# Patient Record
Sex: FEMALE | Race: WHITE | NOT HISPANIC OR LATINO | Employment: FULL TIME | URBAN - METROPOLITAN AREA
[De-identification: names, ages, dates, MRNs, and addresses within clinical notes are randomized per-mention and may not be internally consistent; named-entity substitution may affect disease eponyms.]

---

## 2017-03-02 ENCOUNTER — HOSPITAL ENCOUNTER (OUTPATIENT)
Dept: RADIOLOGY | Facility: MEDICAL CENTER | Age: 52
End: 2017-03-02
Attending: OBSTETRICS & GYNECOLOGY
Payer: COMMERCIAL

## 2017-03-02 DIAGNOSIS — N93.8 DYSFUNCTIONAL UTERINE BLEEDING: ICD-10-CM

## 2017-03-02 PROCEDURE — 76830 TRANSVAGINAL US NON-OB: CPT

## 2017-03-29 DIAGNOSIS — Z01.812 PRE-OPERATIVE LABORATORY EXAMINATION: ICD-10-CM

## 2017-03-29 LAB
BASOPHILS # BLD AUTO: 0.7 % (ref 0–1.8)
BASOPHILS # BLD: 0.06 K/UL (ref 0–0.12)
EOSINOPHIL # BLD AUTO: 0.07 K/UL (ref 0–0.51)
EOSINOPHIL NFR BLD: 0.9 % (ref 0–6.9)
ERYTHROCYTE [DISTWIDTH] IN BLOOD BY AUTOMATED COUNT: 41.8 FL (ref 35.9–50)
HCG SERPL QL: NEGATIVE
HCT VFR BLD AUTO: 33.7 % (ref 37–47)
HGB BLD-MCNC: 10.1 G/DL (ref 12–16)
IMM GRANULOCYTES # BLD AUTO: 0.03 K/UL (ref 0–0.11)
IMM GRANULOCYTES NFR BLD AUTO: 0.4 % (ref 0–0.9)
LYMPHOCYTES # BLD AUTO: 2.7 K/UL (ref 1–4.8)
LYMPHOCYTES NFR BLD: 32.8 % (ref 22–41)
MCH RBC QN AUTO: 23.3 PG (ref 27–33)
MCHC RBC AUTO-ENTMCNC: 30 G/DL (ref 33.6–35)
MCV RBC AUTO: 77.6 FL (ref 81.4–97.8)
MONOCYTES # BLD AUTO: 0.74 K/UL (ref 0–0.85)
MONOCYTES NFR BLD AUTO: 9 % (ref 0–13.4)
NEUTROPHILS # BLD AUTO: 4.62 K/UL (ref 2–7.15)
NEUTROPHILS NFR BLD: 56.2 % (ref 44–72)
NRBC # BLD AUTO: 0 K/UL
NRBC BLD AUTO-RTO: 0 /100 WBC
PLATELET # BLD AUTO: 402 K/UL (ref 164–446)
PMV BLD AUTO: 10.7 FL (ref 9–12.9)
RBC # BLD AUTO: 4.34 M/UL (ref 4.2–5.4)
WBC # BLD AUTO: 8.2 K/UL (ref 4.8–10.8)

## 2017-03-29 PROCEDURE — 36415 COLL VENOUS BLD VENIPUNCTURE: CPT

## 2017-03-29 PROCEDURE — 84703 CHORIONIC GONADOTROPIN ASSAY: CPT

## 2017-03-29 PROCEDURE — 85025 COMPLETE CBC W/AUTO DIFF WBC: CPT

## 2017-03-29 RX ORDER — OMEPRAZOLE 20 MG/1
20 CAPSULE, DELAYED RELEASE ORAL EVERY MORNING
Status: ON HOLD | COMMUNITY
End: 2017-04-19

## 2017-04-19 ENCOUNTER — HOSPITAL ENCOUNTER (OUTPATIENT)
Facility: MEDICAL CENTER | Age: 52
End: 2017-04-19
Attending: OBSTETRICS & GYNECOLOGY | Admitting: OBSTETRICS & GYNECOLOGY
Payer: COMMERCIAL

## 2017-04-19 VITALS
OXYGEN SATURATION: 99 % | HEIGHT: 65 IN | TEMPERATURE: 97.6 F | BODY MASS INDEX: 25.71 KG/M2 | HEART RATE: 79 BPM | RESPIRATION RATE: 19 BRPM | WEIGHT: 154.32 LBS

## 2017-04-19 PROBLEM — N93.9 VAGINA BLEEDING: Status: ACTIVE | Noted: 2017-04-19

## 2017-04-19 LAB
HCG UR QL: NEGATIVE
SP GR UR REFRACTOMETRY: 1.02

## 2017-04-19 PROCEDURE — 700111 HCHG RX REV CODE 636 W/ 250 OVERRIDE (IP)

## 2017-04-19 PROCEDURE — 160035 HCHG PACU - 1ST 60 MINS PHASE I: Performed by: OBSTETRICS & GYNECOLOGY

## 2017-04-19 PROCEDURE — 160002 HCHG RECOVERY MINUTES (STAT): Performed by: OBSTETRICS & GYNECOLOGY

## 2017-04-19 PROCEDURE — 700101 HCHG RX REV CODE 250

## 2017-04-19 PROCEDURE — 160009 HCHG ANES TIME/MIN: Performed by: OBSTETRICS & GYNECOLOGY

## 2017-04-19 PROCEDURE — A9270 NON-COVERED ITEM OR SERVICE: HCPCS

## 2017-04-19 PROCEDURE — 81025 URINE PREGNANCY TEST: CPT

## 2017-04-19 PROCEDURE — 160047 HCHG PACU  - EA ADDL 30 MINS PHASE II: Performed by: OBSTETRICS & GYNECOLOGY

## 2017-04-19 PROCEDURE — 160029 HCHG SURGERY MINUTES - 1ST 30 MINS LEVEL 4: Performed by: OBSTETRICS & GYNECOLOGY

## 2017-04-19 PROCEDURE — 501667 HCHG TUBING, HYSTEROSCOPY: Performed by: OBSTETRICS & GYNECOLOGY

## 2017-04-19 PROCEDURE — 110382 HCHG SHELL REV 271: Performed by: OBSTETRICS & GYNECOLOGY

## 2017-04-19 PROCEDURE — 160046 HCHG PACU - 1ST 60 MINS PHASE II: Performed by: OBSTETRICS & GYNECOLOGY

## 2017-04-19 PROCEDURE — 500766 HCHG KIT, D & E COLLECTION: Performed by: OBSTETRICS & GYNECOLOGY

## 2017-04-19 PROCEDURE — 502560 HCHG KIT, NOVASURE ENDOMETRIAL: Performed by: OBSTETRICS & GYNECOLOGY

## 2017-04-19 PROCEDURE — 88305 TISSUE EXAM BY PATHOLOGIST: CPT

## 2017-04-19 PROCEDURE — 160036 HCHG PACU - EA ADDL 30 MINS PHASE I: Performed by: OBSTETRICS & GYNECOLOGY

## 2017-04-19 PROCEDURE — 502240 HCHG MISC OR SUPPLY RC 0272: Performed by: OBSTETRICS & GYNECOLOGY

## 2017-04-19 PROCEDURE — 502587 HCHG PACK, D&C: Performed by: OBSTETRICS & GYNECOLOGY

## 2017-04-19 PROCEDURE — 160041 HCHG SURGERY MINUTES - EA ADDL 1 MIN LEVEL 4: Performed by: OBSTETRICS & GYNECOLOGY

## 2017-04-19 PROCEDURE — 160025 RECOVERY II MINUTES (STATS): Performed by: OBSTETRICS & GYNECOLOGY

## 2017-04-19 PROCEDURE — 700102 HCHG RX REV CODE 250 W/ 637 OVERRIDE(OP)

## 2017-04-19 PROCEDURE — A6404 STERILE GAUZE > 48 SQ IN: HCPCS | Performed by: OBSTETRICS & GYNECOLOGY

## 2017-04-19 PROCEDURE — A4606 OXYGEN PROBE USED W OXIMETER: HCPCS | Performed by: OBSTETRICS & GYNECOLOGY

## 2017-04-19 PROCEDURE — 160048 HCHG OR STATISTICAL LEVEL 1-5: Performed by: OBSTETRICS & GYNECOLOGY

## 2017-04-19 RX ORDER — ONDANSETRON 2 MG/ML
4 INJECTION INTRAMUSCULAR; INTRAVENOUS EVERY 6 HOURS PRN
Status: DISCONTINUED | OUTPATIENT
Start: 2017-04-19 | End: 2017-04-19 | Stop reason: HOSPADM

## 2017-04-19 RX ORDER — OXYCODONE HCL 5 MG/5 ML
SOLUTION, ORAL ORAL
Status: COMPLETED
Start: 2017-04-19 | End: 2017-04-19

## 2017-04-19 RX ORDER — KETOROLAC TROMETHAMINE 30 MG/ML
30 INJECTION, SOLUTION INTRAMUSCULAR; INTRAVENOUS
Status: DISCONTINUED | OUTPATIENT
Start: 2017-04-19 | End: 2017-04-19 | Stop reason: HOSPADM

## 2017-04-19 RX ORDER — MIDAZOLAM HYDROCHLORIDE 1 MG/ML
INJECTION INTRAMUSCULAR; INTRAVENOUS
Status: DISCONTINUED
Start: 2017-04-19 | End: 2017-04-19 | Stop reason: HOSPADM

## 2017-04-19 RX ORDER — IBUPROFEN 200 MG
600 TABLET ORAL EVERY 6 HOURS PRN
Status: DISCONTINUED | OUTPATIENT
Start: 2017-04-19 | End: 2017-04-19 | Stop reason: HOSPADM

## 2017-04-19 RX ORDER — HYDROCODONE BITARTRATE AND ACETAMINOPHEN 5; 325 MG/1; MG/1
2 TABLET ORAL EVERY 6 HOURS PRN
Status: DISCONTINUED | OUTPATIENT
Start: 2017-04-19 | End: 2017-04-19 | Stop reason: HOSPADM

## 2017-04-19 RX ORDER — SODIUM CHLORIDE, SODIUM LACTATE, POTASSIUM CHLORIDE, CALCIUM CHLORIDE 600; 310; 30; 20 MG/100ML; MG/100ML; MG/100ML; MG/100ML
1000 INJECTION, SOLUTION INTRAVENOUS
Status: COMPLETED | OUTPATIENT
Start: 2017-04-19 | End: 2017-04-19

## 2017-04-19 RX ORDER — LIDOCAINE HYDROCHLORIDE 10 MG/ML
INJECTION, SOLUTION INFILTRATION; PERINEURAL
Status: COMPLETED
Start: 2017-04-19 | End: 2017-04-19

## 2017-04-19 RX ORDER — SODIUM CHLORIDE, SODIUM LACTATE, POTASSIUM CHLORIDE, CALCIUM CHLORIDE 600; 310; 30; 20 MG/100ML; MG/100ML; MG/100ML; MG/100ML
INJECTION, SOLUTION INTRAVENOUS CONTINUOUS
Status: DISCONTINUED | OUTPATIENT
Start: 2017-04-19 | End: 2017-04-19 | Stop reason: HOSPADM

## 2017-04-19 RX ORDER — PROMETHAZINE HYDROCHLORIDE 25 MG/1
25 SUPPOSITORY RECTAL EVERY 4 HOURS PRN
Status: DISCONTINUED | OUTPATIENT
Start: 2017-04-19 | End: 2017-04-19 | Stop reason: HOSPADM

## 2017-04-19 RX ORDER — ACETAMINOPHEN 325 MG/1
650 TABLET ORAL ONCE
COMMUNITY

## 2017-04-19 RX ORDER — BUPIVACAINE HYDROCHLORIDE AND EPINEPHRINE 2.5; 5 MG/ML; UG/ML
INJECTION, SOLUTION EPIDURAL; INFILTRATION; INTRACAUDAL; PERINEURAL
Status: DISCONTINUED | OUTPATIENT
Start: 2017-04-19 | End: 2017-04-19 | Stop reason: HOSPADM

## 2017-04-19 RX ORDER — HYDROCODONE BITARTRATE AND ACETAMINOPHEN 5; 325 MG/1; MG/1
1 TABLET ORAL EVERY 4 HOURS PRN
Status: DISCONTINUED | OUTPATIENT
Start: 2017-04-19 | End: 2017-04-19 | Stop reason: HOSPADM

## 2017-04-19 RX ADMIN — OXYCODONE HYDROCHLORIDE 5 MG: 5 SOLUTION ORAL at 12:15

## 2017-04-19 RX ADMIN — LIDOCAINE HYDROCHLORIDE 0.1 ML: 10 INJECTION, SOLUTION INFILTRATION; PERINEURAL at 09:38

## 2017-04-19 RX ADMIN — SODIUM CHLORIDE, SODIUM LACTATE, POTASSIUM CHLORIDE, CALCIUM CHLORIDE 1000 ML: 600; 310; 30; 20 INJECTION, SOLUTION INTRAVENOUS at 09:38

## 2017-04-19 ASSESSMENT — PAIN SCALES - GENERAL
PAINLEVEL_OUTOF10: 0
PAINLEVEL_OUTOF10: 2
PAINLEVEL_OUTOF10: 0
PAINLEVEL_OUTOF10: 2
PAINLEVEL_OUTOF10: 0
PAINLEVEL_OUTOF10: 2
PAINLEVEL_OUTOF10: 2
PAINLEVEL_OUTOF10: 0

## 2017-04-19 NOTE — OP REPORT
DATE OF SERVICE:  04/19/2017    PREOPERATIVE DIAGNOSES:  Dysfunctional uterine bleeding, thickened endometrial   lining as big as 2.7 cm on an ultrasound.    POSTOPERATIVE DIAGNOSES:  Dysfunctional uterine bleeding, thickened   endometrial lining as big as 2.7 cm on an ultrasound.    PROCEDURE PERFORMED:  Hysteroscopy, dilation and curettage, NovaSure   endometrial ablation.    SURGEON:  Sejal Harmon MD.    ASSISTANT:  GRISELDA Goodwin    ANESTHESIOLOGIST:  Nella Galan MD    ANESTHESIA:  General LMA.    ESTIMATED BLOOD LOSS:  Minimal.    FINDINGS AT THE TIME OF SURGERY:  Exam under anesthesia reveals a fairly   normal sized uterus.  There are no adnexal masses.  Hysteroscopic findings   reveal a fairly normal cavity with ostia visualized bilaterally.  There were a   couple small polypoid looking pieces of tissue right in the lower uterine   segment anteriorly.  I was able to remove these with curettage.  The settings   on the NovaSure device were 5 cm in length, 5 cm in width, 75 seconds total   duration, great ablative effect afterwards on hysteroscopic exam.    COMPLICATIONS:  None.    TECHNIQUE:  Patient was taken to the operating room where general anesthesia   was placed.  She was then placed in the [facility] with excellent   positioning, prepped and draped in the normal sterile fashion.  A Graves   speculum was then used to view the vagina.  Marcaine 0.25% with epinephrine   was then injected into the anterior lip of the cervix and a tenaculum was used   to grasp the anterior lip of the cervix and the cervix was easily dilated to   8 mm.  A deeper paracervical block was then performed with 0.25% Marcaine with   epinephrine about 8 mL on each side.  The hysteroscope was then introduced   and the above findings were noted.  The hysteroscope was removed and a   fractional dilation and curettage was then performed.  Kevorkian curette for   the endocervical cells and a small sharp curette for  the endometrial   curettage.  These specimens were sent separately to pathology.  I then   measured the length of the cervix, which was 9 cm, the length of the cervix   was 4 cm, so the length of the cavity was 5 cm.  This was entered into the   machine and on the device.  The device was introduced and deployed.    Appropriate seating technique was instituted and the width of the cavity was   noted to be 5 cm.  This was entered into the machine and a cavity assessment   test performed and passed without difficulty.  The endometrial ablation was   then allowed to start and finish on its own.  It turned off in approximately 1   minute 15 seconds.  The device was then removed and hysteroscopy of the   cavity was then performed.  There was great ablative effect throughout.  The   hysteroscope was then removed and the instruments removed from the vagina.    Silver nitrate was placed at the tenaculum site as well as at the ectocervix.    Patient tolerated the procedure very well and was brought to recovery room in   stable condition.       ____________________________________     MD VANE RO / HERMELINDO    DD:  04/19/2017 12:10:33  DT:  04/19/2017 12:53:13    D#:  [id number]  Job#:  [id number]

## 2017-04-19 NOTE — DISCHARGE INSTRUCTIONS
ACTIVITY: Rest and take it easy for the first 24 hours.  A responsible adult is recommended to remain with you during that time.  It is normal to feel sleepy.  We encourage you to not do anything that requires balance, judgment or coordination.    MILD FLU-LIKE SYMPTOMS ARE NORMAL. YOU MAY EXPERIENCE GENERALIZED MUSCLE ACHES, THROAT IRRITATION, HEADACHE AND/OR SOME NAUSEA.    FOR 24 HOURS DO NOT:  Drive, operate machinery or run household appliances.  Drink beer or alcoholic beverages.   Make important decisions or sign legal documents.    SPECIAL INSTRUCTIONS: *Keep bowels soft and regular, use Colace 2 Xs daily or Milk of Magnesia as needed for constipation**    DIET: To avoid nausea, slowly advance diet as tolerated, avoiding spicy or greasy foods for the first day.  Add more substantial food to your diet according to your physician's instructions.  Babies can be fed formula or breast milk as soon as they are hungry.  INCREASE FLUIDS AND FIBER TO AVOID CONSTIPATION.    SURGICAL DRESSING/BATHING: *May shower as desired, baths okay**    FOLLOW-UP APPOINTMENT:  A follow-up appointment should be arranged with your doctor; call to schedule.    You should CALL YOUR PHYSICIAN if you develop:  Fever greater than 101 degrees F.  Pain not relieved by medication, or persistent nausea or vomiting.  Excessive bleeding (blood soaking through dressing) or unexpected drainage from the wound.  Extreme redness or swelling around the incision site, drainage of pus or foul smelling drainage.  Inability to urinate or empty your bladder within 8 hours.  Problems with breathing or chest pain.    You should call 911 if you develop problems with breathing or chest pain.  If you are unable to contact your doctor or surgical center, you should go to the nearest emergency room or urgent care center.  Physician's telephone #: *[phone]**    If any questions arise, call your doctor.  If your doctor is not available, please feel free to  call the Surgical Center at [phone].  The Center is open Monday through Friday from 7AM to 7PM.  You can also call the HEALTH HOTLINE open 24 hours/day, 7 days/week and speak to a nurse at [phone], or toll free at [phone].    A registered nurse may call you a few days after your surgery to see how you are doing after your procedure.    MEDICATIONS: Resume taking daily medication.  Take prescribed pain medication with food.  If no medication is prescribed, you may take non-aspirin pain medication if needed.  PAIN MEDICATION CAN BE VERY CONSTIPATING.  Take a stool softener or laxative such as senokot, pericolace, or milk of magnesia if needed.    Prescription given for *(at home)**.  Last pain medication given at *12:15pm**.    If your physician has prescribed pain medication that includes Acetaminophen (Tylenol), do not take additional Acetaminophen (Tylenol) while taking the prescribed medication.    Depression / Suicide Risk    As you are discharged from this [facility] facility, it is important to learn how to keep safe from harming yourself.    Recognize the warning signs:  · Abrupt changes in personality, positive or negative- including increase in energy   · Giving away possessions  · Change in eating patterns- significant weight changes-  positive or negative  · Change in sleeping patterns- unable to sleep or sleeping all the time   · Unwillingness or inability to communicate  · Depression  · Unusual sadness, discouragement and loneliness  · Talk of wanting to die  · Neglect of personal appearance   · Rebelliousness- reckless behavior  · Withdrawal from people/activities they love  · Confusion- inability to concentrate     If you or a loved one observes any of these behaviors or has concerns about self-harm, here's what you can do:  · Talk about it- your feelings and reasons for harming yourself  · Remove any means that you might use to hurt yourself (examples: pills, rope, extension  cords, firearm)  · Get professional help from the community (Mental Health, Substance Abuse, psychological counseling)  · Do not be alone:Call your Safe Contact- someone whom you trust who will be there for you.  · Call your local CRISIS HOTLINE 960-1145 or 730-506-2561  · Call your local Children's Mobile Crisis Response Team [place] (670) 022-1508 or www.PolySpot  · Call the toll free National Suicide Prevention Hotlines   · National Suicide Prevention Lifeline 136-771-QUDG (5425)  · National Hope Line Network 800-SUICIDE (621-2920)

## 2017-04-19 NOTE — H&P
DATE OF OPERATION:  2017    CHIEF COMPLAINT:  Heavy irregular bleeding, enlarged uterus and anemia.    HISTORY OF PRESENT ILLNESS:  Patient is a _____-year-old G2, P1 AB1 who   presented to my office complaining of irregular bleeding.  In January, she   bled for 5 weeks.  She was placed on Aygestin and has not had any bleeding   since that time.  She was anemic and is taking iron.  Her ultrasound showed   uterus measuring 12.9x4.6x5.25 cm, otherwise normal cavity.  The treatment   options were discussed with the patient and she has decided to proceed with   hysteroscopy, D and C, and NovaSure if appropriate.  Risks, benefits,   alternatives and procedure were discussed with the patient in detail and she   agrees to proceed.    PAST MEDICAL HISTORY:  Reflux, placenta previa in her pregnancy in .    PAST SURGICAL HISTORY:   section in .    MENSTRUAL HISTORY:  The patient underwent menarche at age 17 and has periods   every 21 days, lasts for about 3 days, more recently she had a 5-week period.    No longer bleeding on Aygestin.    PAST OBSTETRICAL HISTORY:  She has had 2 pregnancies, 1 baby born alive, that   baby was delivered by  section.    MEDICATIONS:  Iron, Aygestin and omeprazole.    FAMILY HISTORY:  Mother  at age 61 from a heart attack.    SOCIAL HISTORY:  The patient does not smoke, drinks occasionally, does not do   recreational drugs.    ALLERGIES:  TO AMOXICILLIN.    PHYSICAL EXAMINATION:  VITAL SIGNS:  Blood pressure is 128/76, weight is 157, height is 5 feet 5   inches.  HEENT:  Within normal limits.  NECK:  Supple, without lymphadenopathy or thyromegaly.  CARDIOVASCULAR:  Regular rate and rhythm.  LUNGS:  Clear to auscultation.  BACK:  No CVA tenderness.  ABDOMEN:  Soft and nontender, nondistended.  No masses are palpable.  PELVIC:  EGBUS appears normal.  Vagina appears normal.  Cervix appears normal.    Bimanual exam reveals a slightly generous sized uterus, otherwise  smooth in   contour and there are no adnexal masses.  EXTREMITIES:  Benign.    ASSESSMENT:  1.  A _____-year-old G2, P1, AB1.  2.  Heavy irregular bleeding on Aygestin without bleeding.  3.  History of anemia, taking iron.  4.  Enlarged uterus.  5.  History of  section.    PLAN:  The patient is scheduled for hysteroscopy, dilation and curettage and   NovaSure endometrial ablation if appropriate.  Risks, benefits, alternatives   and procedure were discussed with the patient in detail and she agrees to   proceed.       ____________________________________     MD VANE RO / HERMELINDO    DD:  2017 21:52:31  DT:  2017 22:09:38    D#:  [id number]  Job#:  [id number]

## 2017-04-19 NOTE — IP AVS SNAPSHOT
" Home Care Instructions                                                                                                                Name:Lianne Rodriguez [place]  Medical Record Number:[id number]  CSN: [id number]    YOB: 1965   Age: 52 y.o.  Sex: female  HT:1.651 m (5' 5\") WT: 70 kg (154 lb 5.2 oz)          Admit Date: 4/19/2017     Discharge Date:   Today's Date: 4/19/2017  Attending Doctor:  Sejal Harmon M.D.                  Allergies:  Penicillins; Sulfa drugs; and Tape                Discharge Instructions         ACTIVITY: Rest and take it easy for the first 24 hours.  A responsible adult is recommended to remain with you during that time.  It is normal to feel sleepy.  We encourage you to not do anything that requires balance, judgment or coordination.    MILD FLU-LIKE SYMPTOMS ARE NORMAL. YOU MAY EXPERIENCE GENERALIZED MUSCLE ACHES, THROAT IRRITATION, HEADACHE AND/OR SOME NAUSEA.    FOR 24 HOURS DO NOT:  Drive, operate machinery or run household appliances.  Drink beer or alcoholic beverages.   Make important decisions or sign legal documents.    SPECIAL INSTRUCTIONS: *Keep bowels soft and regular, use Colace 2 Xs daily or Milk of Magnesia as needed for constipation**    DIET: To avoid nausea, slowly advance diet as tolerated, avoiding spicy or greasy foods for the first day.  Add more substantial food to your diet according to your physician's instructions.  Babies can be fed formula or breast milk as soon as they are hungry.  INCREASE FLUIDS AND FIBER TO AVOID CONSTIPATION.    SURGICAL DRESSING/BATHING: *May shower as desired, baths okay**    FOLLOW-UP APPOINTMENT:  A follow-up appointment should be arranged with your doctor; call to schedule.    You should CALL YOUR PHYSICIAN if you develop:  Fever greater than 101 degrees F.  Pain not relieved by medication, or persistent nausea or vomiting.  Excessive bleeding (blood soaking through dressing) or unexpected drainage from the " wound.  Extreme redness or swelling around the incision site, drainage of pus or foul smelling drainage.  Inability to urinate or empty your bladder within 8 hours.  Problems with breathing or chest pain.    You should call 911 if you develop problems with breathing or chest pain.  If you are unable to contact your doctor or surgical center, you should go to the nearest emergency room or urgent care center.  Physician's telephone #: *[phone]**    If any questions arise, call your doctor.  If your doctor is not available, please feel free to call the Surgical Center at [phone].  The Center is open Monday through Friday from 7AM to 7PM.  You can also call the [email or web address] HOTLINE open 24 hours/day, 7 days/week and speak to a nurse at [phone], or toll free at [phone].    A registered nurse may call you a few days after your surgery to see how you are doing after your procedure.    MEDICATIONS: Resume taking daily medication.  Take prescribed pain medication with food.  If no medication is prescribed, you may take non-aspirin pain medication if needed.  PAIN MEDICATION CAN BE VERY CONSTIPATING.  Take a stool softener or laxative such as senokot, pericolace, or milk of magnesia if needed.    Prescription given for *(at home)**.  Last pain medication given at *12:15pm**.    If your physician has prescribed pain medication that includes Acetaminophen (Tylenol), do not take additional Acetaminophen (Tylenol) while taking the prescribed medication.    Depression / Suicide Risk    As you are discharged from this [facility] [place] [facility] facility, it is important to learn how to keep safe from harming yourself.    Recognize the warning signs:  · Abrupt changes in personality, positive or negative- including increase in energy   · Giving away possessions  · Change in eating patterns- significant weight changes-  positive or negative  · Change in sleeping patterns- unable to sleep or sleeping all the  time   · Unwillingness or inability to communicate  · Depression  · Unusual sadness, discouragement and loneliness  · Talk of wanting to die  · Neglect of personal appearance   · Rebelliousness- reckless behavior  · Withdrawal from people/activities they love  · Confusion- inability to concentrate     If you or a loved one observes any of these behaviors or has concerns about self-harm, here's what you can do:  · Talk about it- your feelings and reasons for harming yourself  · Remove any means that you might use to hurt yourself (examples: pills, rope, extension cords, firearm)  · Get professional help from the community (Mental Health, Substance Abuse, psychological counseling)  · Do not be alone:Call your Safe Contact- someone whom you trust who will be there for you.  · Call your local CRISIS HOTLINE 166-9300 or 667-769-0393  · Call your local Children's Mobile Crisis Response Team [place] (494) 226-4534 or www.Appydrink  · Call the toll free National Suicide Prevention Hotlines   · National Suicide Prevention Lifeline 740-709-KUSP (3705)  · [employer] Hope Line Network 800-SUICIDE (583-2501)       Medication List      CONTINUE taking these medications        Instructions    Morning Afternoon Evening Bedtime    acetaminophen 325 MG Tabs   Commonly known as:  TYLENOL        Take 650 mg by mouth Once.   Dose:  650 mg                          STOP taking these medications     norethindrone 5 MG tablet   Commonly known as:  AYGESTIN               omeprazole 20 MG delayed-release capsule   Commonly known as:  PRILOSEC                       Medication Information     Above and/or attached are the medications your physician expects you to take upon discharge. Review all of your home medications and newly ordered medications with your doctor and/or pharmacist. Follow medication instructions as directed by your doctor and/or pharmacist. Please keep your medication list with you and share with your physician.  Update the information when medications are discontinued, doses are changed, or new medications (including over-the-counter products) are added; and carry medication information at all times in the event of emergency situations.        Resources     Quit Smoking / Tobacco Use:    I understand the use of any tobacco products increases my chance of suffering from future heart disease or stroke and could cause other illnesses which may shorten my life. Quitting the use of tobacco products is the single most important thing I can do to improve my health. For further information on smoking / tobacco cessation call a Toll Free Quit Line at 1-921.410.3063 (*National [employer] [place]) or 1-568.221.4815 (American Lung Association) or you can access the web based program at www.lungusa.org.    [place] Tobacco Users Help Line:  (561) 765-6107       Toll Free: 1-882.325.8804  Quit Tobacco Program [facility] (708)647-6647    Crisis Hotline:    [place] Crisis Hotline:  2-892-TSHQFBC or 1-794.596.8461    [place] Crisis Hotline:    1-546.654.7586 or 322-397-9472    Discharge Survey:   Thank you for choosing [facility]. We hope we did everything we could to make your hospital stay a pleasant one. You may be receiving a survey and we would appreciate your time and participation in answering the questions. Your input is very valuable to us in our efforts to improve our service to our patients and their families.            Signatures     My signature on this form indicates that:    1. I acknowledge receipt and understanding of these Home Care Instruction.  2. My questions regarding this information have been answered to my satisfaction.  3. I have formulated a plan with my discharge nurse to obtain my prescribed medications for home.    __________________________________      __________________________________                   Patient Signature                                 Guardian/Responsible Adult  Signature      __________________________________                 __________       ________                       Nurse Signature                                               Date                 Time

## 2017-04-19 NOTE — IP AVS SNAPSHOT
4/19/2017    Lianne Rodriguez [place]  [address]  [place] [address]    Dear Lianne:    [facility] wants to ensure your discharge home is safe and you or your loved ones have had all of your questions answered regarding your care after you leave the hospital.    Below is a list of resources and contact information should you have any questions regarding your hospital stay, follow-up instructions, or active medical symptoms.    Questions or Concerns Regarding… Contact   Medical Questions Related to Your Discharge  (7 days a week, 8am-5pm) Contact a Nurse Care Coordinator   [phone]   Medical Questions Not Related to Your Discharge  (24 hours a day / 7 days a week)  Contact the Nurse Health Line   [phone]    Medications or Discharge Instructions Refer to your discharge packet   or contact your [facility] Primary Care Provider   [phone]   Follow-up Appointment(s) Schedule your appointment via [employer]   or contact Scheduling [phone]   Billing Review your statement via [employer]  or contact Billing [phone]   Medical Records Review your records via [employer]   or contact Medical Records [phone]     You may receive a telephone call within two days of discharge. This call is to make certain you understand your discharge instructions and have the opportunity to have any questions answered. You can also easily access your medical information, test results and upcoming appointments via the [employer] free online health management tool. You can learn more and sign up at [email or web address]. For assistance setting up your [employer] account, please call [phone].    Once again, we want to ensure your discharge home is safe and that you have a clear understanding of any next steps in your care. If you have any questions or concerns, please do not hesitate to contact us, we are here for you. Thank you for choosing [facility] for your healthcare needs.    Sincerely,    Your [facility] Healthcare Team

## 2017-04-19 NOTE — OR SURGEON
Immediate Post-Operative Note      PreOp Diagnosis: DUB, thickened endometrial lining 2.7 cm,     PostOp Diagnosis: Same    Procedure(s):  HYSTEROSCOPY NOVASURE - Wound Class: Clean Contaminated  DILATION AND CURETTAGE - Wound Class: Clean Contaminated    Surgeon(s):  Sejal Harmon M.D.    Anesthesiologist/Type of Anesthesia:  Anesthesiologist: Nella Galan M.D./General    Surgical Staff:  Assistant: Spencer Dsouza R.N.  Circulator: Shahana Torres R.N.  Relief Circulator: Melanie Gonzales R.N.  Scrub Person: Sahra Diane; Spencer Azul    Specimen: ECC, EMC    Estimated Blood Loss: Min    Findings: Uterus is fairly normal size, no masses, settings = length is 5 cm , width is 5 cm, 75 secs, great ablative effect, small polyp seen at the ana laura of uterus, this was removed,     Complications: None        4/19/2017 12:04 PM Sejal Harmon

## 2019-04-08 ENCOUNTER — OFFICE VISIT (OUTPATIENT)
Dept: URGENT CARE | Facility: CLINIC | Age: 54
End: 2019-04-08
Payer: COMMERCIAL

## 2019-04-08 VITALS
RESPIRATION RATE: 12 BRPM | SYSTOLIC BLOOD PRESSURE: 128 MMHG | HEART RATE: 92 BPM | WEIGHT: 145 LBS | TEMPERATURE: 98.2 F | DIASTOLIC BLOOD PRESSURE: 84 MMHG | BODY MASS INDEX: 24.16 KG/M2 | OXYGEN SATURATION: 98 % | HEIGHT: 65 IN

## 2019-04-08 DIAGNOSIS — J02.9 SORE THROAT: ICD-10-CM

## 2019-04-08 DIAGNOSIS — J02.9 PHARYNGITIS, UNSPECIFIED ETIOLOGY: ICD-10-CM

## 2019-04-08 LAB
INT CON NEG: NEGATIVE
INT CON POS: POSITIVE
S PYO AG THROAT QL: NEGATIVE

## 2019-04-08 PROCEDURE — 87880 STREP A ASSAY W/OPTIC: CPT | Performed by: NURSE PRACTITIONER

## 2019-04-08 PROCEDURE — 99203 OFFICE O/P NEW LOW 30 MIN: CPT | Performed by: NURSE PRACTITIONER

## 2019-04-08 RX ORDER — AZITHROMYCIN 250 MG/1
TABLET, FILM COATED ORAL
Qty: 6 TAB | Refills: 0 | Status: SHIPPED | OUTPATIENT
Start: 2019-04-08

## 2019-04-08 RX ORDER — OMEPRAZOLE 20 MG/1
1 CAPSULE, DELAYED RELEASE ORAL DAILY
COMMUNITY
Start: 2019-02-19

## 2019-04-08 ASSESSMENT — ENCOUNTER SYMPTOMS
CHILLS: 0
TROUBLE SWALLOWING: 1
SORE THROAT: 1
SWOLLEN GLANDS: 1
FEVER: 0

## 2019-04-08 ASSESSMENT — PAIN SCALES - GENERAL: PAINLEVEL: 8=MODERATE-SEVERE PAIN

## 2019-04-08 NOTE — PROGRESS NOTES
Subjective:      Lianne Ramon is a 54 y.o. female who presents with Pharyngitis (x3 days); Cough; and Nasal Congestion    Past Medical History:   Diagnosis Date   • Gynecological disorder 3/2017    abnormal uterine bleeding   • Heart burn    • Indigestion      Social History     Social History   • Marital status:      Spouse name: N/A   • Number of children: N/A   • Years of education: N/A     Occupational History   • Not on file.     Social History Main Topics   • Smoking status: Never Smoker   • Smokeless tobacco: Never Used   • Alcohol use 1.2 oz/week     2 Glasses of wine per week   • Drug use: No   • Sexual activity: Not on file     Other Topics Concern   • Not on file     Social History Narrative   • No narrative on file     History reviewed. No pertinent family history.    Allergies: Penicillins; Sulfa drugs; and Tape    Patient is a 54-year-old female who presents today with complaint of sore throat.  Symptoms started over the last 2 days.  States she has mild nasal congestion but this is very minor.  She states cough is due to her throat being sore and irritated.  No other upper respiratory symptoms.  States she has felt feverish, no body aches or chills.         Pharyngitis     This is a new problem. The current episode started in the past 7 days. The problem has been unchanged.  Neither side of throat is experiencing more pain than the other. There has been no fever. Associated symptoms include swollen glands and trouble swallowing. She has tried nothing for the symptoms. The treatment provided no relief.       Review of Systems   Constitutional: Positive for malaise/fatigue. Negative for chills and fever.   HENT: Positive for sore throat and trouble swallowing.    Skin: Negative.    All other systems reviewed and are negative.         Objective:     /84 (BP Location: Left arm, Patient Position: Sitting, BP Cuff Size: Adult)   Pulse 92   Temp 36.8 °C (98.2 °F) (Temporal)    "Resp 12   Ht 1.651 m (5' 5\")   Wt 65.8 kg (145 lb)   SpO2 98%   BMI 24.13 kg/m²       Physical Exam   Constitutional: She is oriented to person, place, and time. She appears well-developed and well-nourished.   HENT:   Head: Normocephalic.   Right Ear: External ear normal.   Left Ear: External ear normal.   Nose: Nose normal.   Mouth/Throat: No oropharyngeal exudate.   Oropharynx is red   Eyes: Pupils are equal, round, and reactive to light. Conjunctivae and EOM are normal.   Neck: Normal range of motion. Neck supple.   Cardiovascular: Normal rate and regular rhythm.    Pulmonary/Chest: Effort normal and breath sounds normal.   Neurological: She is alert and oriented to person, place, and time.   Skin: Skin is warm and dry. Capillary refill takes less than 2 seconds.   Psychiatric: She has a normal mood and affect. Her behavior is normal. Judgment and thought content normal.   Vitals reviewed.    poct strep: negative           Assessment/Plan:     1. Sore throat  2. Pharyngitis    -RX for zithromax; start only for worsening of symptoms  -warm saltwater gargles  -tylenol/motrin PRN  -push fluids  -follow up for persistent symptoms.      "

## 2021-11-23 ENCOUNTER — OFFICE VISIT (OUTPATIENT)
Dept: URGENT CARE | Facility: CLINIC | Age: 56
End: 2021-11-23
Payer: COMMERCIAL

## 2021-11-23 VITALS
OXYGEN SATURATION: 98 % | HEART RATE: 73 BPM | SYSTOLIC BLOOD PRESSURE: 140 MMHG | WEIGHT: 155.2 LBS | RESPIRATION RATE: 16 BRPM | HEIGHT: 65 IN | DIASTOLIC BLOOD PRESSURE: 70 MMHG | BODY MASS INDEX: 25.86 KG/M2 | TEMPERATURE: 97.4 F

## 2021-11-23 DIAGNOSIS — J02.0 STREPTOCOCCAL PHARYNGITIS: ICD-10-CM

## 2021-11-23 PROCEDURE — 99213 OFFICE O/P EST LOW 20 MIN: CPT | Performed by: NURSE PRACTITIONER

## 2021-11-23 RX ORDER — CEFDINIR 300 MG/1
300 CAPSULE ORAL 2 TIMES DAILY
Qty: 20 CAPSULE | Refills: 0 | Status: SHIPPED | OUTPATIENT
Start: 2021-11-23 | End: 2021-12-03

## 2021-11-23 ASSESSMENT — ENCOUNTER SYMPTOMS
ABDOMINAL PAIN: 0
DIARRHEA: 0
HOARSE VOICE: 1
FEVER: 0
SORE THROAT: 1
TROUBLE SWALLOWING: 1
COUGH: 0
HEADACHES: 0
SWOLLEN GLANDS: 1
CHILLS: 0

## 2021-11-23 NOTE — PATIENT INSTRUCTIONS
Strep Infections  Streptococcal (strep) infections are caused by streptococcal germs (bacteria). Strep infections are very contagious. Strep infections can occur in:  · Ears.   · The nose.   · The throat.   · Sinuses.   · Skin.   · Blood.   · Lungs.   · Spinal fluid.   · Urine.   Strep throat is the most common bacterial infection in children. The symptoms of a Strep infection usually get better in 2 to 3 days after starting medicine that kills germs (antibiotics). Strep is usually not contagious after 36 to 48 hours of antibiotic treatment. Strep infections that are not treated can cause serious complications. These include gland infections, throat abscess, rheumatic fever and kidney disease.  DIAGNOSIS   The diagnosis of strep is made by:  · A culture for the strep germ.   TREATMENT   These infections require oral antibiotics for a full 10 days, an antibiotic shot or antibiotics given into the vein (intravenous, IV).  HOME CARE INSTRUCTIONS   · Be sure to finish all antibiotics even if feeling better.   · Only take over-the-counter medicines for pain, discomfort and or fever, as directed by your caregiver.   · Close contacts that have a fever, sore throat or illness symptoms should see their caregiver right away.   · You or your child may return to work, school or  if the fever and pain are better in 2 to 3 days after starting antibiotics.   SEEK MEDICAL CARE IF:   · You or your child has an oral temperature above 102° F (38.9° C).   · Your baby is older than 3 months with a rectal temperature of 100.5° F (38.1° C) or higher for more than 1 day.   · You or your child is not better in 3 days.   SEEK IMMEDIATE MEDICAL CARE IF:   · You or your child has an oral temperature above 102° F (38.9° C), not controlled by medicine.   · Your baby is older than 3 months with a rectal temperature of 102° F (38.9° C) or higher.   · Your baby is 3 months old or younger with a rectal temperature of 100.4° F (38° C) or  higher.   · There is a spreading rash.   · There is difficulty swallowing or breathing.   · There is increased pain or swelling.   Document Released: 01/25/2006 Document Revised: 03/11/2013 Document Reviewed: 11/03/2010  [employer]® Patient Information ©2013 [employer], [employer].

## 2021-11-23 NOTE — PROGRESS NOTES
Subjective:     Lianne Ramon is a 56 y.o. female who presents for Pharyngitis (x 2 days, it has become worse and feels like it's closing up, painful to swallow.)      Pharyngitis   This is a new problem. The current episode started in the past 7 days (2 days ago Marcella developed a sore throat). The problem has been gradually worsening. Neither side of throat is experiencing more pain than the other. There has been no fever. The pain is at a severity of 7/10. Associated symptoms include a hoarse voice, swollen glands and trouble swallowing. Pertinent negatives include no abdominal pain, congestion, coughing, diarrhea or headaches. She has tried acetaminophen, NSAIDs, cool liquids and gargles (honey) for the symptoms. The treatment provided mild relief.         Review of Systems   Constitutional: Negative for chills, fever and malaise/fatigue.   HENT: Positive for hoarse voice, sore throat and trouble swallowing. Negative for congestion.    Respiratory: Negative for cough.    Gastrointestinal: Negative for abdominal pain and diarrhea.   Neurological: Negative for headaches.       PMH:   Past Medical History:   Diagnosis Date   • Gynecological disorder 3/2017    abnormal uterine bleeding   • Heart burn    • Indigestion      ALLERGIES:   Allergies   Allergen Reactions   • Penicillins Hives     Entire body   • Sulfa Drugs Hives   • Tape      Pulls skin off   • Amoxicillin Rash     SURGHX:   Past Surgical History:   Procedure Laterality Date   • HYSTEROSCOPY NOVASURE-2  4/19/2017    Procedure: HYSTEROSCOPY NOVASURE;  Surgeon: Sejal Harmon M.D.;  Location: SURGERY [facility];  Service:    • DILATION AND CURETTAGE  4/19/2017    Procedure: DILATION AND CURETTAGE;  Surgeon: Sejal Harmon M.D.;  Location: SURGERY [facility];  Service:    • GYN SURGERY      C section   • PRIMARY C SECTION       SOCHX:   Social History     Socioeconomic History   • Marital status:      Spouse name: Not  on file   • Number of children: Not on file   • Years of education: Not on file   • Highest education level: Not on file   Occupational History   • Not on file   Tobacco Use   • Smoking status: Never Smoker   • Smokeless tobacco: Never Used   Vaping Use   • Vaping Use: Never used   Substance and Sexual Activity   • Alcohol use: Yes     Alcohol/week: 1.2 oz     Types: 2 Glasses of wine per week   • Drug use: No   • Sexual activity: Yes     Partners: Male     Birth control/protection: Female Sterilization   Other Topics Concern   • Not on file   Social History Narrative   • Not on file     Social Determinants of Health     Financial Resource Strain:    • Difficulty of Paying Living Expenses: Not on file   Food Insecurity:    • Worried About Running Out of Food in the Last Year: Not on file   • Ran Out of Food in the Last Year: Not on file   Transportation Needs:    • Lack of Transportation (Medical): Not on file   • Lack of Transportation (Non-Medical): Not on file   Physical Activity:    • Days of Exercise per Week: Not on file   • Minutes of Exercise per Session: Not on file   Stress:    • Feeling of Stress : Not on file   Social Connections:    • Frequency of Communication with Friends and Family: Not on file   • Frequency of Social Gatherings with Friends and Family: Not on file   • Attends Restoration Services: Not on file   • Active Member of Clubs or Organizations: Not on file   • Attends Club or Organization Meetings: Not on file   • Marital Status: Not on file   Intimate Partner Violence:    • Fear of Current or Ex-Partner: Not on file   • Emotionally Abused: Not on file   • Physically Abused: Not on file   • Sexually Abused: Not on file   Housing Stability:    • Unable to Pay for Housing in the Last Year: Not on file   • Number of Places Lived in the Last Year: Not on file   • Unstable Housing in the Last Year: Not on file     FH: History reviewed. No pertinent family history.      Objective:   /70 (BP  "Location: Left arm, Patient Position: Sitting, BP Cuff Size: Adult)   Pulse 73   Temp 36.3 °C (97.4 °F) (Temporal)   Resp 16   Ht 1.651 m (5' 5\")   Wt 70.4 kg (155 lb 3.2 oz)   SpO2 98%   BMI 25.83 kg/m²     Physical Exam  Vitals and nursing note reviewed.   Constitutional:       General: She is not in acute distress.     Appearance: Normal appearance. She is not ill-appearing.   HENT:      Head: Normocephalic and atraumatic.      Right Ear: External ear normal.      Left Ear: External ear normal.      Nose: No congestion or rhinorrhea.      Mouth/Throat:      Mouth: Mucous membranes are moist.      Pharynx: Uvula midline. Posterior oropharyngeal erythema present. No pharyngeal swelling, oropharyngeal exudate or uvula swelling.      Tonsils: No tonsillar exudate or tonsillar abscesses. 1+ on the right. 1+ on the left.   Eyes:      Extraocular Movements: Extraocular movements intact.      Pupils: Pupils are equal, round, and reactive to light.   Cardiovascular:      Rate and Rhythm: Normal rate and regular rhythm.      Pulses: Normal pulses.      Heart sounds: Normal heart sounds.   Pulmonary:      Effort: Pulmonary effort is normal.      Breath sounds: Normal breath sounds.   Abdominal:      General: Abdomen is flat. Bowel sounds are normal.      Palpations: Abdomen is soft.      Tenderness: There is no abdominal tenderness. There is no right CVA tenderness or left CVA tenderness.   Musculoskeletal:         General: Normal range of motion.      Cervical back: Normal range of motion and neck supple. No tenderness.   Lymphadenopathy:      Cervical: No cervical adenopathy.   Skin:     General: Skin is warm and dry.      Capillary Refill: Capillary refill takes less than 2 seconds.   Neurological:      General: No focal deficit present.      Mental Status: She is alert and oriented to person, place, and time. Mental status is at baseline.   Psychiatric:         Mood and Affect: Mood normal.         Behavior: " Behavior normal.         Thought Content: Thought content normal.         Judgment: Judgment normal.         Assessment/Plan:   Assessment    1. Streptococcal pharyngitis  cefdinir (OMNICEF) 300 MG Cap     We discussed supportive measures including humidifier, warm salt water gargles, over-the-counter Cepacol throat lozenges, rest  and increased fluids. Pt was encouraged to seek treatment back in the ER or urgent care for worsening symptoms,  fever greater than 100.5, wheezes or shortness of breath.    AVS handout given and reviewed with patient. Pt educated on red flags and when to seek treatment back in ER or UC.

## 2022-11-17 ENCOUNTER — OFFICE VISIT (OUTPATIENT)
Dept: URGENT CARE | Facility: CLINIC | Age: 57
End: 2022-11-17
Payer: COMMERCIAL

## 2022-11-17 VITALS
HEIGHT: 65 IN | OXYGEN SATURATION: 96 % | WEIGHT: 155 LBS | RESPIRATION RATE: 16 BRPM | SYSTOLIC BLOOD PRESSURE: 134 MMHG | BODY MASS INDEX: 25.83 KG/M2 | HEART RATE: 72 BPM | TEMPERATURE: 97.9 F | DIASTOLIC BLOOD PRESSURE: 86 MMHG

## 2022-11-17 DIAGNOSIS — J02.0 PHARYNGITIS DUE TO STREPTOCOCCUS SPECIES: ICD-10-CM

## 2022-11-17 LAB
INT CON NEG: NORMAL
INT CON POS: NORMAL
S PYO AG THROAT QL: POSITIVE

## 2022-11-17 PROCEDURE — 87880 STREP A ASSAY W/OPTIC: CPT | Performed by: NURSE PRACTITIONER

## 2022-11-17 PROCEDURE — 99213 OFFICE O/P EST LOW 20 MIN: CPT | Performed by: NURSE PRACTITIONER

## 2022-11-17 RX ORDER — CEFDINIR 300 MG/1
300 CAPSULE ORAL 2 TIMES DAILY
Qty: 20 CAPSULE | Refills: 0 | Status: SHIPPED | OUTPATIENT
Start: 2022-11-17 | End: 2022-11-27

## 2022-11-18 NOTE — PROGRESS NOTES
"Patient has consented to treatment and for use of patient information for treatment and billing purposes.    Date: 11/17/22     Arrival Mode: Private Vehicle / Ambulatory    Chief Complaint:    Chief Complaint   Patient presents with    Pharyngitis     Started Sunday, \"Swollen lymph nodes\"        History of Present Illness: 57 y.o. female  presents to clinic on day 5 of sore throat mild nasal congestion and swollen lymph nodes.  Patient also admits to mild ear pain as well.  Denies cough.  No shortness of breath chest pain or leg swelling.  No nausea vomiting diarrhea.  No known sick contacts.      ROS:  As stated in HPI     Pertinent Medical History:    Past Medical History:   Diagnosis Date    Gynecological disorder 3/2017    abnormal uterine bleeding    Heart burn     Indigestion         Pertinent Surgical History:    Past Surgical History:   Procedure Laterality Date    HYSTEROSCOPY NOVASURE-2  4/19/2017    Procedure: HYSTEROSCOPY NOVASURE;  Surgeon: Sejal Harmon M.D.;  Location: SURGERY [facility];  Service:     DILATION AND CURETTAGE  4/19/2017    Procedure: DILATION AND CURETTAGE;  Surgeon: Sejal Harmon M.D.;  Location: SURGERY [facility];  Service:     GYN SURGERY      C section    PRIMARY C SECTION          Current  Medications:  Current Outpatient Medications on File Prior to Visit   Medication Sig Dispense Refill    omeprazole (PRILOSEC) 20 MG delayed-release capsule Take 1 Capsule by mouth every day.      azithromycin (ZITHROMAX) 250 MG Tab Take 2 pills by mouth on day one, take 1 pill by mouth on days 2-5 (Patient not taking: Reported on 11/23/2021) 6 Tab 0    acetaminophen (TYLENOL) 325 MG Tab Take 650 mg by mouth Once. (Patient not taking: Reported on 11/23/2021)       No current facility-administered medications on file prior to visit.        Allergies:     Penicillins, Sulfa drugs, Tape, and Amoxicillin     Social History:   Social History     Socioeconomic History    Marital " status:      Spouse name: Not on file    Number of children: Not on file    Years of education: Not on file    Highest education level: Not on file   Occupational History    Not on file   Tobacco Use    Smoking status: Never    Smokeless tobacco: Never   Vaping Use    Vaping Use: Never used   Substance and Sexual Activity    Alcohol use: Yes     Alcohol/week: 1.2 oz     Types: 2 Glasses of wine per week    Drug use: No    Sexual activity: Yes     Partners: Male     Birth control/protection: Female Sterilization   Other Topics Concern    Not on file   Social History Narrative    Not on file     Social Determinants of Health     Financial Resource Strain: Not on file   Food Insecurity: Not on file   Transportation Needs: Not on file   Physical Activity: Not on file   Stress: Not on file   Social Connections: Not on file   Intimate Partner Violence: Not on file   Housing Stability: Not on file        No LMP recorded. Patient has had an ablation.       Physical Exam:  Vitals:    11/17/22 1639   BP: 134/86   Pulse: 72   Resp: 16   Temp: 36.6 °C (97.9 °F)   SpO2: 96%        Physical Exam  Vitals reviewed.   Constitutional:       General: She is not in acute distress.     Appearance: Normal appearance. She is well-developed. She is not toxic-appearing.   HENT:      Head: Normocephalic and atraumatic.      Right Ear: Tympanic membrane, ear canal and external ear normal.      Left Ear: Tympanic membrane, ear canal and external ear normal.      Nose: Nose normal.      Mouth/Throat:      Lips: Pink.      Mouth: Mucous membranes are moist.      Pharynx: Oropharyngeal exudate and posterior oropharyngeal erythema present.      Tonsils: Tonsillar exudate present. No tonsillar abscesses. 2+ on the right. 1+ on the left.   Eyes:      General: Lids are normal. Gaze aligned appropriately. No allergic shiner or scleral icterus.     Extraocular Movements: Extraocular movements intact.      Conjunctiva/sclera: Conjunctivae normal.       Pupils: Pupils are equal, round, and reactive to light.   Cardiovascular:      Rate and Rhythm: Normal rate and regular rhythm.      Pulses:           Radial pulses are 2+ on the right side and 2+ on the left side.      Heart sounds: Normal heart sounds.   Pulmonary:      Effort: Pulmonary effort is normal.      Breath sounds: Normal breath sounds. No wheezing.   Musculoskeletal:      Right lower leg: No edema.      Left lower leg: No edema.   Lymphadenopathy:      Cervical: Cervical adenopathy (Small tender movable bilaterally) present.      Right cervical: Superficial cervical adenopathy present.   Skin:     General: Skin is warm.      Capillary Refill: Capillary refill takes less than 2 seconds.      Coloration: Skin is not cyanotic or pale.      Findings: No rash.   Neurological:      Mental Status: She is alert.      Gait: Gait is intact.   Psychiatric:         Behavior: Behavior normal. Behavior is cooperative.        Diagnostics:    POCT strep positive    Medical Decision Making:  I personally reviewed prior external notes and test results pertinent to today's visit.   Shared decision-making was utilized with patient did develop treatment plan and clinic course. Stacy, 57 y.o. female presents to clinic with 5-day history of sore throat did obtain a POCT strep of which the results are positive.  Discussed that cervical lymphadenopathy is common with strep pharyngitis.  Patient is allergic to amoxicillin but has tolerated cephalosporins in the past.  We will send for Omnicef 10-day course at this time.    Did advise patient on conservative measures for management of symptoms.  Patient is agreeable to pursue adequate rest, adequate hydration, saltwater gargle and Neti pot for any symptoms of upper respiratory congestion.  Over-the-counter analgesia and antipyretics on a p.r.n. basis as needed for pain and fever.  Did discuss over-the-counter cough medications.      Patient will monitor symptoms closely  for worsening and is advised to seek further evaluation the emergency room if alarm signs or symptoms arise.  Patient states understanding and verbalizes agreement with this plan of care.    Plan:    1. Pharyngitis due to Streptococcus species    - cefdinir (OMNICEF) 300 MG Cap; Take 1 Capsule by mouth 2 times a day for 10 days.  Dispense: 20 Capsule; Refill: 0  - POCT Rapid Strep A         Disposition:  Patient was discharged in stable condition.    Voice Recognition Disclaimer: Portions of this document were created using voice recognition software. The software does have a chance of producing errors of grammar and possibly content. I have made every reasonable attempt to correct obvious errors, but there may be errors of grammar and possibly content that I did not discover before finalizing the documentation.    Zeenat Link, ALEJA.

## 2023-12-07 ENCOUNTER — APPOINTMENT (RX ONLY)
Dept: URBAN - METROPOLITAN AREA CLINIC 15 | Facility: CLINIC | Age: 58
Setting detail: DERMATOLOGY
End: 2023-12-07

## 2023-12-07 DIAGNOSIS — L82.0 INFLAMED SEBORRHEIC KERATOSIS: ICD-10-CM

## 2023-12-07 DIAGNOSIS — L81.4 OTHER MELANIN HYPERPIGMENTATION: ICD-10-CM

## 2023-12-07 DIAGNOSIS — L40.4 GUTTATE PSORIASIS: ICD-10-CM | Status: INADEQUATELY CONTROLLED

## 2023-12-07 PROCEDURE — ? COUNSELING

## 2023-12-07 PROCEDURE — ? PRESCRIPTION MEDICATION MANAGEMENT

## 2023-12-07 PROCEDURE — 17110 DESTRUCTION B9 LES UP TO 14: CPT

## 2023-12-07 PROCEDURE — ? ADDITIONAL NOTES

## 2023-12-07 PROCEDURE — 99203 OFFICE O/P NEW LOW 30 MIN: CPT | Mod: 25

## 2023-12-07 PROCEDURE — ? PRESCRIPTION

## 2023-12-07 PROCEDURE — ? LIQUID NITROGEN

## 2023-12-07 RX ORDER — TRIAMCINOLONE ACETONIDE 1 MG/G
1 CREAM TOPICAL BID
Qty: 80 | Refills: 1 | Status: ERX | COMMUNITY
Start: 2023-12-07

## 2023-12-07 RX ADMIN — TRIAMCINOLONE ACETONIDE 1: 1 CREAM TOPICAL at 00:00

## 2023-12-07 ASSESSMENT — LOCATION ZONE DERM
LOCATION ZONE: FACE
LOCATION ZONE: TRUNK

## 2023-12-07 ASSESSMENT — LOCATION SIMPLE DESCRIPTION DERM
LOCATION SIMPLE: LEFT FOREHEAD
LOCATION SIMPLE: RIGHT LOWER BACK
LOCATION SIMPLE: UPPER BACK
LOCATION SIMPLE: RIGHT CHEEK

## 2023-12-07 ASSESSMENT — LOCATION DETAILED DESCRIPTION DERM
LOCATION DETAILED: SUPERIOR THORACIC SPINE
LOCATION DETAILED: LEFT SUPERIOR FOREHEAD
LOCATION DETAILED: RIGHT SUPERIOR MEDIAL MIDBACK
LOCATION DETAILED: RIGHT SUPERIOR CENTRAL MALAR CHEEK

## 2023-12-07 NOTE — PROCEDURE: LIQUID NITROGEN
Number Of Freeze-Thaw Cycles: 2 freeze-thaw cycles
Render Note In Bullet Format When Appropriate: No
Show Aperture Variable?: Yes
Medical Necessity Information: It is in your best interest to select a reason for this procedure from the list below. All of these items fulfill various CMS LCD requirements except the new and changing color options.
Post-Care Instructions: I reviewed with the patient in detail post-care instructions. Patient is to wear sunprotection, and avoid picking at any of the treated lesions. Pt may apply Vaseline to crusted or scabbing areas.
Detail Level: Detailed
Consent: The patient's consent was obtained including but not limited to risks of crusting, scabbing, blistering, scarring, darker or lighter pigmentary change, recurrence, incomplete removal and infection.
Medical Necessity Clause: This procedure was medically necessary because the lesions that were treated were:
Pared With?: curette
Spray Paint Text: The liquid nitrogen was applied to the skin utilizing a spray paint frosting technique.

## 2023-12-07 NOTE — PROCEDURE: ADDITIONAL NOTES
Render Risk Assessment In Note?: no
Additional Notes: Onset after zoster infection
Detail Level: Simple
Additional Notes: Recommended IPL laser

## 2024-02-01 ENCOUNTER — APPOINTMENT (RX ONLY)
Dept: URBAN - METROPOLITAN AREA CLINIC 36 | Facility: CLINIC | Age: 59
Setting detail: DERMATOLOGY
End: 2024-02-01

## 2024-02-01 DIAGNOSIS — Z41.9 ENCOUNTER FOR PROCEDURE FOR PURPOSES OTHER THAN REMEDYING HEALTH STATE, UNSPECIFIED: ICD-10-CM

## 2024-02-01 PROCEDURE — ? COSMETIC CONSULTATION: BBL

## 2025-03-27 ENCOUNTER — RESULTS FOLLOW-UP (OUTPATIENT)
Dept: URGENT CARE | Facility: CLINIC | Age: 60
End: 2025-03-27

## 2025-03-27 ENCOUNTER — APPOINTMENT (OUTPATIENT)
Dept: URGENT CARE | Facility: CLINIC | Age: 60
End: 2025-03-27

## 2025-03-27 ENCOUNTER — OFFICE VISIT (OUTPATIENT)
Dept: URGENT CARE | Facility: CLINIC | Age: 60
End: 2025-03-27
Payer: COMMERCIAL

## 2025-03-27 VITALS
DIASTOLIC BLOOD PRESSURE: 92 MMHG | HEART RATE: 82 BPM | RESPIRATION RATE: 16 BRPM | OXYGEN SATURATION: 94 % | TEMPERATURE: 97.6 F | BODY MASS INDEX: 27.49 KG/M2 | WEIGHT: 165 LBS | HEIGHT: 65 IN | SYSTOLIC BLOOD PRESSURE: 162 MMHG

## 2025-03-27 DIAGNOSIS — J02.8 PHARYNGITIS DUE TO OTHER ORGANISM: ICD-10-CM

## 2025-03-27 LAB — S PYO DNA SPEC NAA+PROBE: NOT DETECTED

## 2025-03-27 PROCEDURE — 3080F DIAST BP >= 90 MM HG: CPT | Performed by: FAMILY MEDICINE

## 2025-03-27 PROCEDURE — 87651 STREP A DNA AMP PROBE: CPT | Performed by: FAMILY MEDICINE

## 2025-03-27 PROCEDURE — 3077F SYST BP >= 140 MM HG: CPT | Performed by: FAMILY MEDICINE

## 2025-03-27 PROCEDURE — 99213 OFFICE O/P EST LOW 20 MIN: CPT | Performed by: FAMILY MEDICINE

## 2025-03-27 ASSESSMENT — ENCOUNTER SYMPTOMS: SORE THROAT: 1

## 2025-03-27 NOTE — PROGRESS NOTES
"Subjective     Sandy Ramon is a 60 y.o. female who presents with Sore Throat (Congestion, onset yesterday)    This is a  new problem with uncertain prognosis:    60 y.o. who has come to the walk-in clinic today for sore throat for about 3 to 4 days ago the last 4-day and went away in the last night sore throat came back again.  Worried about strep throat.  No coughing.  Slight nasal congestion.  No fevers or chills          ALLERGIES:  Penicillins, Sulfa drugs, Tape, and Amoxicillin     PMH:  Past Medical History:   Diagnosis Date    Gynecological disorder 3/2017    abnormal uterine bleeding    Heart burn     Indigestion         PSH:  Past Surgical History:   Procedure Laterality Date    HYSTEROSCOPY NOVASURE-2  4/19/2017    Procedure: HYSTEROSCOPY NOVASURE;  Surgeon: Sejal Harmon M.D.;  Location: SURGERY [facility];  Service:     DILATION AND CURETTAGE  4/19/2017    Procedure: DILATION AND CURETTAGE;  Surgeon: Sejal Harmon M.D.;  Location: SURGERY [facility];  Service:     GYN SURGERY      C section    PRIMARY C SECTION         MEDS:    Current Outpatient Medications:     omeprazole (PRILOSEC) 20 MG delayed-release capsule, Take 1 Capsule by mouth every day., Disp: , Rfl:     ** I have documented what I find to be significant in regards to past medical, social, family and surgical history  in my HPI or under PMH/PSH/FH review section, otherwise it is noncontributory **           HPI    Review of Systems   HENT:  Positive for congestion and sore throat.    All other systems reviewed and are negative.             Objective     BP (!) 162/92 (BP Location: Right arm, Patient Position: Sitting, BP Cuff Size: Adult)   Pulse 82   Temp 36.4 °C (97.6 °F) (Temporal)   Resp 16   Ht 1.651 m (5' 5\")   Wt 74.8 kg (165 lb)   SpO2 94%   BMI 27.46 kg/m²      Physical Exam  Vitals and nursing note reviewed.   Constitutional:       General: She is not in acute distress.     Appearance: " Normal appearance. She is well-developed. She is not ill-appearing, toxic-appearing or diaphoretic.   HENT:      Head: Normocephalic.      Mouth/Throat:      Mouth: Mucous membranes are moist.      Pharynx: Oropharynx is clear. Posterior oropharyngeal erythema present.   Cardiovascular:      Rate and Rhythm: Normal rate and regular rhythm.      Heart sounds: Normal heart sounds.   Pulmonary:      Effort: Pulmonary effort is normal. No respiratory distress.      Breath sounds: Normal breath sounds.   Neurological:      Mental Status: She is alert.      Motor: No abnormal muscle tone.   Psychiatric:         Mood and Affect: Mood normal.         Behavior: Behavior normal.         1. Pharyngitis due to other organism  POCT GROUP A STREP, PCR          - Dx, plan & d/c instructions discussed   - Rest, stay hydrated, OTC Motrin and/or Tylenol as needed      Follow up with your regular primary care providers office within a week to keep them updated and informed of this visit and for regular routine health maintenance check-ups. ER if not improving in 2-3 days or if feeling/getting worse. (If you do not have a primary care provider and need to schedule one you may call [facility] at [phone] to do this).    Patient left in stable condition               Discussed if any in-clinic testing done they should check [facility] later today for results and instructions.  Testing such as strep, covid, flu, RSV and x-rays    Discussed if any testing, labs or imaging studies are obtained outside of the [facility] facility, it is their responsibility to contact the Urgent Care and let us know that it was done and get us the results so adequate follow up can be initiated    Any pertinent prior lab work and/or imaging studies in Epic have been reviewed by me today on day of this visit and taken into account for my treatment and plan today    Any pertinent PMH/PSH and/or chronic conditions and medications if any were reviewed today and taken  into account for my treatment and plan today    Pertinent prior office visit, ER and urgent care notes in Epic have been reviewed by me today on day of this visit.    Please note that this dictation may have been created using voice recognition software, if so I have made every reasonable attempt to correct obvious errors, but I expect that there are errors of grammar and possibly content that I did not discover before finalizing the note.

## (undated) DEVICE — Device

## (undated) DEVICE — KIT ROOM DECONTAMINATION

## (undated) DEVICE — SODIUM CHL IRRIGATION 0.9% 1000ML (12EA/CA)

## (undated) DEVICE — TUBING CLEARLINK DUO-VENT - C-FLO (48EA/CA)

## (undated) DEVICE — LACTATED RINGERS INJ 1000 ML - (14EA/CA 60CA/PF)

## (undated) DEVICE — LEAD SET 6 DISP. EKG NIHON KOHDEN (100EA/CA)

## (undated) DEVICE — CANISTER SUCTION 3000ML MECHANICAL FILTER AUTO SHUTOFF MEDI-VAC NONSTERILE LF DISP  (40EA/CA)

## (undated) DEVICE — GLOVE BIOGEL ECLIPSE  PF LATEX SIZE 6.5 (50PR/BX)

## (undated) DEVICE — GLOVE BIOGEL PI INDICATOR SZ 7.0 SURGICAL PF LF - (50/BX 4BX/CA)

## (undated) DEVICE — PROTECTOR ULNA NERVE - (36PR/CA)

## (undated) DEVICE — HEAD HOLDER JUNIOR/ADULT

## (undated) DEVICE — SENSOR SPO2 NEO LNCS ADHESIVE (20/BX) SEE USER NOTES

## (undated) DEVICE — DRAPE UNDER BUTTOCKS FLUID - (20/CA)

## (undated) DEVICE — DRAPE MAYO STAND - (30/CA)

## (undated) DEVICE — MASK ANESTHESIA ADULT  - (100/CA)

## (undated) DEVICE — TUBING OUTFLOW HYSTEROSCPY (10EA/BX)

## (undated) DEVICE — KIT D & C COLLECTION (10EA/PK)

## (undated) DEVICE — COVER MAYO STAND X-LG - (22EA/CA)

## (undated) DEVICE — SET LEADWIRE 5 LEAD BEDSIDE DISPOSABLE ECG (1SET OF 5/EA)

## (undated) DEVICE — SUCTION INSTRUMENT YANKAUER BULBOUS TIP W/O VENT (50EA/CA)

## (undated) DEVICE — JELLY, KY 2 0Z STERILE

## (undated) DEVICE — GLOVE BIOGEL SZ 6.5 SURGICAL PF LTX (50PR/BX 4BX/CA)

## (undated) DEVICE — TUBING INFLOW HYSTEROSCOPY (10EA/CA)

## (undated) DEVICE — GLOVE BIOGEL SZ 7 SURGICAL PF LTX - (50PR/BX 4BX/CA)

## (undated) DEVICE — ELECTRODE 850 FOAM ADHESIVE - HYDROGEL RADIOTRNSPRNT (50/PK)

## (undated) DEVICE — DRAPE STRLE REG TOWEL 18X24 - (10/BX 4BX/CA)"

## (undated) DEVICE — ADVANCED NOVASURE STERILE DEVICE (3EA/PK)

## (undated) DEVICE — NEEDLE NON SAFETY HYPO 22 GA X 1 1/2 IN (100/BX)

## (undated) DEVICE — GOWN WARMING STANDARD FLEX - (30/CA)

## (undated) DEVICE — PAD SANITARY 11IN MAXI IND WRAPPED  (12EA/PK 24PK/CA)

## (undated) DEVICE — SODIUM CHL. IRRIGATION 0.9% 3000ML (4EA/CA 65CA/PF)

## (undated) DEVICE — SET EXTENSION WITH 2 PORTS (48EA/CA) ***PART #2C8610 IS A SUBSTITUTE*****

## (undated) DEVICE — SYRINGE 10 ML CONTROL LL (25EA/BX 4BX/CA)

## (undated) DEVICE — SLEEVE, VASO, THIGH, MED

## (undated) DEVICE — DRESSING NON-ADHERING 8 X 3 - (50/BX)

## (undated) DEVICE — KIT ANESTHESIA W/CIRCUIT & 3/LT BAG W/FILTER (20EA/CA)

## (undated) DEVICE — ELECTRODE DUAL RETURN W/ CORD - (50/PK)

## (undated) DEVICE — GLOVE BIOGEL SZ 8 SURGICAL PF LTX - (50PR/BX 4BX/CA)

## (undated) DEVICE — NEPTUNE 4 PORT MANIFOLD - (20/PK)